# Patient Record
Sex: FEMALE | URBAN - METROPOLITAN AREA
[De-identification: names, ages, dates, MRNs, and addresses within clinical notes are randomized per-mention and may not be internally consistent; named-entity substitution may affect disease eponyms.]

---

## 2022-11-05 ENCOUNTER — OFFICE (OUTPATIENT)
Dept: URBAN - METROPOLITAN AREA CLINIC 104 | Facility: CLINIC | Age: 52
Setting detail: OPHTHALMOLOGY
End: 2022-11-05
Payer: MEDICAID

## 2022-11-05 DIAGNOSIS — H10.433: ICD-10-CM

## 2022-11-05 PROCEDURE — 99213 OFFICE O/P EST LOW 20 MIN: CPT | Performed by: OPTOMETRIST

## 2022-11-05 ASSESSMENT — CONFRONTATIONAL VISUAL FIELD TEST (CVF)
OS_FINDINGS: FULL
OD_FINDINGS: FULL

## 2022-11-05 ASSESSMENT — LID EXAM ASSESSMENTS
OD_BLEPHARITIS: RLL RUL 2+ 3+
OD_COMMENTS: EYELID/MEIBOMIAN GLAND EXPRESSION PERFORMED
OS_BLEPHARITIS: LLL LUL 2+ 3+
OS_COMMENTS: EYELID/MEIBOMIAN GLAND EXPRESSION PERFORMED

## 2022-11-05 ASSESSMENT — VISUAL ACUITY
OS_BCVA: 20/20
OD_BCVA: 20/20-1

## 2022-11-19 ENCOUNTER — OFFICE (OUTPATIENT)
Dept: URBAN - METROPOLITAN AREA CLINIC 104 | Facility: CLINIC | Age: 52
Setting detail: OPHTHALMOLOGY
End: 2022-11-19
Payer: MEDICAID

## 2022-11-19 DIAGNOSIS — H10.433: ICD-10-CM

## 2022-11-19 PROBLEM — H10.43 ALLERGIC CONJUNCTIVITIS: Status: ACTIVE | Noted: 2022-11-19

## 2022-11-19 PROCEDURE — 99213 OFFICE O/P EST LOW 20 MIN: CPT | Performed by: OPTOMETRIST

## 2022-11-19 ASSESSMENT — LID EXAM ASSESSMENTS
OS_BLEPHARITIS: LLL LUL 2+ 3+
OD_COMMENTS: EYELID/MEIBOMIAN GLAND EXPRESSION PERFORMED
OS_COMMENTS: EYELID/MEIBOMIAN GLAND EXPRESSION PERFORMED
OD_BLEPHARITIS: RLL RUL 2+ 3+

## 2022-11-19 ASSESSMENT — VISUAL ACUITY
OS_BCVA: 20/20-2
OD_BCVA: 20/20-2

## 2022-11-19 ASSESSMENT — TONOMETRY
OS_IOP_MMHG: 19
OD_IOP_MMHG: 19

## 2022-11-19 ASSESSMENT — CONFRONTATIONAL VISUAL FIELD TEST (CVF)
OS_FINDINGS: FULL
OD_FINDINGS: FULL

## 2023-09-28 PROBLEM — Z00.00 ENCOUNTER FOR PREVENTIVE HEALTH EXAMINATION: Status: ACTIVE | Noted: 2023-09-28

## 2023-10-02 DIAGNOSIS — Z86.018 PERSONAL HISTORY OF OTHER BENIGN NEOPLASM: ICD-10-CM

## 2023-10-16 ENCOUNTER — APPOINTMENT (OUTPATIENT)
Dept: CARDIOLOGY | Facility: CLINIC | Age: 53
End: 2023-10-16
Payer: MEDICAID

## 2023-10-16 ENCOUNTER — NON-APPOINTMENT (OUTPATIENT)
Age: 53
End: 2023-10-16

## 2023-10-16 VITALS
SYSTOLIC BLOOD PRESSURE: 120 MMHG | DIASTOLIC BLOOD PRESSURE: 76 MMHG | HEIGHT: 65 IN | HEART RATE: 64 BPM | WEIGHT: 168 LBS | BODY MASS INDEX: 27.99 KG/M2 | RESPIRATION RATE: 16 BRPM

## 2023-10-16 DIAGNOSIS — Z87.891 PERSONAL HISTORY OF NICOTINE DEPENDENCE: ICD-10-CM

## 2023-10-16 DIAGNOSIS — Z85.3 PERSONAL HISTORY OF MALIGNANT NEOPLASM OF BREAST: ICD-10-CM

## 2023-10-16 DIAGNOSIS — D05.12 INTRADUCTAL CARCINOMA IN SITU OF LEFT BREAST: ICD-10-CM

## 2023-10-16 PROCEDURE — 99243 OFF/OP CNSLTJ NEW/EST LOW 30: CPT

## 2023-10-16 PROCEDURE — 93000 ELECTROCARDIOGRAM COMPLETE: CPT

## 2023-12-07 ENCOUNTER — APPOINTMENT (OUTPATIENT)
Dept: CARDIOLOGY | Facility: CLINIC | Age: 53
End: 2023-12-07
Payer: MEDICAID

## 2023-12-07 DIAGNOSIS — R94.39 ABNORMAL RESULT OF OTHER CARDIOVASCULAR FUNCTION STUDY: ICD-10-CM

## 2023-12-07 PROCEDURE — 93015 CV STRESS TEST SUPVJ I&R: CPT

## 2023-12-19 ENCOUNTER — APPOINTMENT (OUTPATIENT)
Dept: CARDIOLOGY | Facility: CLINIC | Age: 53
End: 2023-12-19

## 2024-02-27 ENCOUNTER — APPOINTMENT (OUTPATIENT)
Dept: CARDIOLOGY | Facility: CLINIC | Age: 54
End: 2024-02-27
Payer: MEDICAID

## 2024-02-27 PROCEDURE — A9500: CPT

## 2024-02-27 PROCEDURE — 78452 HT MUSCLE IMAGE SPECT MULT: CPT

## 2024-02-27 PROCEDURE — 93015 CV STRESS TEST SUPVJ I&R: CPT

## 2024-02-27 RX ORDER — AMINOPHYLLINE 25 MG/ML
25 INJECTION, SOLUTION INTRAVENOUS
Qty: 0 | Refills: 0 | Status: COMPLETED | OUTPATIENT
Start: 2024-02-27

## 2024-02-27 RX ADMIN — REGADENOSON 0.4 MG/5ML: 0.08 INJECTION, SOLUTION INTRAVENOUS at 00:00

## 2024-03-01 RX ORDER — REGADENOSON 0.08 MG/ML
0.4 INJECTION, SOLUTION INTRAVENOUS
Refills: 0 | Status: COMPLETED | OUTPATIENT
Start: 2024-02-27

## 2024-04-09 RX ORDER — KIT FOR THE PREPARATION OF TECHNETIUM TC99M SESTAMIBI 1 MG/5ML
INJECTION, POWDER, LYOPHILIZED, FOR SOLUTION PARENTERAL
Refills: 0 | Status: COMPLETED | OUTPATIENT
Start: 2024-04-09

## 2024-04-09 RX ADMIN — KIT FOR THE PREPARATION OF TECHNETIUM TC99M SESTAMIBI 0: 1 INJECTION, POWDER, LYOPHILIZED, FOR SOLUTION PARENTERAL at 00:00

## 2024-05-28 ENCOUNTER — APPOINTMENT (OUTPATIENT)
Dept: CARDIOLOGY | Facility: CLINIC | Age: 54
End: 2024-05-28
Payer: MEDICAID

## 2024-05-28 ENCOUNTER — NON-APPOINTMENT (OUTPATIENT)
Age: 54
End: 2024-05-28

## 2024-05-28 VITALS
RESPIRATION RATE: 16 BRPM | SYSTOLIC BLOOD PRESSURE: 120 MMHG | HEART RATE: 68 BPM | BODY MASS INDEX: 28.66 KG/M2 | DIASTOLIC BLOOD PRESSURE: 72 MMHG | HEIGHT: 65 IN | WEIGHT: 172 LBS

## 2024-05-28 PROCEDURE — 99213 OFFICE O/P EST LOW 20 MIN: CPT

## 2024-05-28 PROCEDURE — 93000 ELECTROCARDIOGRAM COMPLETE: CPT

## 2024-05-28 NOTE — ASSESSMENT
[FreeTextEntry1] : Exercise stress test December 7, 2023 during which the patient exercised via a Олег protocol for 6 minutes and 47 seconds, totaling 8 METS.  Peak heart rate achieved was 115 bpm, representing 69% of age-predicted maximum.  Blood pressure response to exercise was normal.  EKG showed no evidence of ischemia but was nondiagnostic due to failure to achieve heart rate.  Nuclear stress test February 27, 2024 during which the patient initially exercised via a Олег protocol for 6 minutes and 12 seconds, totaling 7 METS.  Peak heart rate achieved was 150 bpm, representing 70% of age-predicted maximum.  Due to failure to achieve heart rate test was converted to a pharmacologic study which was tolerated well.  Blood pressure response to infusion was normal.  EKG showed no evidence of ischemia with exercise or infusion.  Evaluation of nuclear ridging showed no evidence of ischemia or infarct and ejection fraction of 71%.  53-year-old female with no significant cardiovascular past medical history but a family history of CAD in her mother and brother in their 50s who presented to me for cardiac evaluation given her family history and her concerns for cardiovascular disease.  Patient presents back feeling generally well with some pain in her jaw and upper chest which is likely GI in nature.  Her stress testing ultimately showed no evidence of ischemia or infarct and a normal EF.  Her insurance did not approve echocardiogram given her lack of symptoms and I will hold off on that for now.  No evidence of heart failure at this time.  Her neck and upper chest discomforts are likely secondary to a GI cause and I do not believe any additional cardiac testing is needed for that.  I have however again recommend calcium score and repeat blood work to see if there is any indication for statin therapy given her family history.

## 2024-05-28 NOTE — DISCUSSION/SUMMARY
[FreeTextEntry1] : 1.  Check calcium score to risk stratify and assess her risk for cardiovascular disease given her concerns. 2.  She will have blood work done. 3.  No additional cardiac medications at this time. 4.  Patient is encouraged to exercise at least 30 minutes a day every day of the week. 5.  Patient encouraged to work on a healthy diet high in lean protein, whole grains and vegetables, and lower in white flour and simple sugars. 6.  We will discuss results of her calcium score and blood work over the phone.  We will determine if there is any need for additional cardiac follow-up at that time. [EKG obtained to assist in diagnosis and management of assessed problem(s)] : EKG obtained to assist in diagnosis and management of assessed problem(s)

## 2024-05-28 NOTE — HISTORY OF PRESENT ILLNESS
[FreeTextEntry1] : Patient presents back to the office feeling reasonably well.  She does report occasionally getting some pain in her jaw that then seems to go into her upper chest.  This seems to occur at random and was happening just today when she was driving here.  No other real specific physical symptoms at this time.  Patient denies shortness of breath, palpitations, orthopnea, presyncope, syncope.

## 2025-09-21 ENCOUNTER — NON-APPOINTMENT (OUTPATIENT)
Age: 55
End: 2025-09-21